# Patient Record
(demographics unavailable — no encounter records)

---

## 2024-11-20 NOTE — PROCEDURE
[FreeTextEntry1] : ct chest done 9/17/24 reviewed  tracy done 11/20/24: no obstruction, no restriction, normal  PFT 2022: no obstruction. no restriction. DLCO elevated. Obstruction seen on tracy 2/24/21 improved  ct chest done 1/7/22 reviewed  compliance reviewed

## 2024-11-20 NOTE — PHYSICAL EXAM
[General Appearance - Well Developed] : well developed [Normal Appearance] : normal appearance [Well Groomed] : well groomed [General Appearance - Well Nourished] : well nourished [No Deformities] : no deformities [General Appearance - In No Acute Distress] : no acute distress [Normal Conjunctiva] : the conjunctiva exhibited no abnormalities [Eyelids - No Xanthelasma] : the eyelids demonstrated no xanthelasmas [Neck Appearance] : the appearance of the neck was normal [Jugular Venous Distention Increased] : there was no jugular-venous distention [Heart Rate And Rhythm] : heart rate and rhythm were normal [Heart Sounds] : normal S1 and S2 [Arterial Pulses Normal] : the arterial pulses were normal [Bowel Sounds] : normal bowel sounds [Abdomen Soft] : soft [] : no hepato-splenomegaly [Abnormal Walk] : normal gait [Cranial Nerves] : cranial nerves 2-12 were intact [No Focal Deficits] : no focal deficits [Oriented To Time, Place, And Person] : oriented to person, place, and time [Impaired Insight] : insight and judgment were intact [Affect] : the affect was normal [Normal Oral Mucosa] : normal oral mucosa [No Oral Pallor] : no oral pallor [No Oral Cyanosis] : no oral cyanosis [Normal Rate] : the respiratory rate was normal [Rate ___] : at [unfilled] breaths per minute [Normal Rhythm/Effort] : normal respiratory rhythm and effort [Clear Bilaterally] : the lungs were clear to auscultation bilaterally [Normal Breath Sounds] : normal bilateral breath sounds [Rales / Crackles Bilateral] : no rales or crackles were heard [Wheezing Bilaterally] : no wheezing was heard [Rhonchi Bilateral] : no rhonchi were heard [Decreased Breath Sounds] : breath sounds were not diminished [Nail Clubbing] : no clubbing of the fingernails [Cyanosis, Localized] : no localized cyanosis [Petechial Hemorrhages (___cm)] : no petechial hemorrhages [Skin Color & Pigmentation] : normal skin color and pigmentation [Skin Turgor] : normal skin turgor [FreeTextEntry1] : minimally crowded [Acc Muscles Use] : no accessory muscle use [Air Hunger] : no air hunger [Distress] : no respiratory distress [Dry Cough] : no dry cough [Wet Cough] : no wet cough [Prolonged Exp Time] : expiratory time was not prolonged [Increased E/I Ratio] : no increase in the expiratory/inspiratory ratio

## 2024-11-20 NOTE — HISTORY OF PRESENT ILLNESS
[Obstructive Sleep Apnea] : obstructive sleep apnea [Date: ___] : Date of most recent diagnostic polysomnogram: [unfilled] [AHI: ___ per hour] : Apnea-hypopnea index:  [unfilled] per hour [CPAP: ___ cmH2O] : CPAP: [unfilled] cmH2O [% Days used: ____] : Days used: [unfilled] % [% Days used > 4 hrs: ____] : Days used > 4 hrs: [unfilled] % [Therapy based AHI: ___ /hr] : Therapy based AHI: [unfilled] / hr [Never] : never [Snoring] : no snoring [Frequent Nocturnal Awakening] : no nocturnal awakening [FreeTextEntry1] :  hx asthma, allergies.   She says her breathing still feels great. No issues. On breo. No sob, wheeze, cough.  Still on singulair. Albuterol prn; hardly ever needs it; sometimes needed with environmental exposures.  tracy done  11/20/24 is normal.   Postnasal drip improved. Sees Dr Castillo. Getting AI. Takes zyrtec during day of AI or prn.  Feeling much better since allergies treated.    Elevated IgE.  On CPAP 5-20 for severe WILFRED. Got current machine in 2/23/19. Doing well on CPAP. Therapeutic AHI WNL. Compliance excellent. Feels much better on it.

## 2024-11-20 NOTE — CONSULT LETTER
[Dear  ___] : Dear  [unfilled], [Courtesy Letter:] : I had the pleasure of seeing your patient, [unfilled], in my office today. [Consult Closing:] : Thank you very much for allowing me to participate in the care of this patient.  If you have any questions, please do not hesitate to contact me. [DrAmanda  ___] : Dr. GARCIA [___] : [unfilled] [Yogesh Ulloa MD] : Yogesh Ulloa MD

## 2024-11-20 NOTE — PLAN
[TextEntry] : Continue breo. Rinse mouth after use. Continue singulair. Albuterol prn.  Allergy trt per Dr Castillo.   Repeat CT chest next year.  Continue autoPAP. Will order new machine.  Cardiology f/u.  Weight loss a must. Flu vaccine annually.

## 2025-02-12 NOTE — HISTORY OF PRESENT ILLNESS
[Obstructive Sleep Apnea] : obstructive sleep apnea [Date: ___] : Date of most recent diagnostic polysomnogram: [unfilled] [AHI: ___ per hour] : Apnea-hypopnea index:  [unfilled] per hour [CPAP: ___ cmH2O] : CPAP: [unfilled] cmH2O [% Days used: ____] : Days used: [unfilled] % [% Days used > 4 hrs: ____] : Days used > 4 hrs: [unfilled] % [Therapy based AHI: ___ /hr] : Therapy based AHI: [unfilled] / hr [Never] : never [Snoring] : no snoring [Frequent Nocturnal Awakening] : no nocturnal awakening [FreeTextEntry1] :  hx asthma, allergies.   She says her breathing still feels great. No issues. On breo. No sob, wheeze, cough.  Still on singulair. Albuterol prn; hardly ever needs it; sometimes needed with environmental exposures.  tracy done 11/20/24 is normal.   Postnasal drip improved. Was seeing Dr Castillo. Reports AI completed. Takes zyrtec during day of AI or prn.  Feeling much better since allergies treated.    Elevated IgE.  On CPAP 5-20 for severe WILFRED. Got a new machine 11/27/24, S11.  Doing well on CPAP. Therapeutic AHI WNL. Compliance excellent. Feels much better on it.

## 2025-02-12 NOTE — REVIEW OF SYSTEMS
[Recent Wt Gain (___ Lbs)] : recent [unfilled] ~Ulb weight gain [As Noted in HPI] : as noted in HPI [Arthralgias] : arthralgias [Fever] : no fever [Chills] : no chills [Fatigue] : no fatigue [Poor Appetite] : normal appetite  [Nasal Congestion] : no nasal congestion [Epistaxis] : no nosebleeds [Postnasal Drip] : no postnasal drip [Sinus Problems] : no sinus problems [PND] : no PND [Orthopnea] : no orthopnea [Dysrhythmia] : no dysrhythmia [Murmurs] : no murmurs were heard [Palpitations] : no palpitations [Edema] : ~T edema was not present [Hay Fever] : no hay fever [Itchy Eyes] : no itching of ~T the eyes [Nasal Discharge] : no nasal discharge [Heartburn] : no heartburn [Reflux] : no reflux [Indigestion] : no indigestion [Nocturia] : no nocturia [Frequency] : no change in urinary frequency [Dysuria] : no dysuria [Raynaud] : no Raynaud's phenomenon was observed [Scleroderma] : no scleroderma [Anemia] : no anemia [Headache] : no headache [Dizziness] : no dizziness [Syncope] : no fainting

## 2025-02-12 NOTE — PLAN
[TextEntry] : Continue breo. Rinse mouth after use. Continue singulair. Albuterol prn.  Allergy trt per Dr Castillo.   Repeat CT chest 9/2025.  Continue autoPAP.  Cardiology f/u.  Weight loss a must. Flu vaccine annually.